# Patient Record
Sex: MALE | Race: WHITE | Employment: OTHER | URBAN - METROPOLITAN AREA
[De-identification: names, ages, dates, MRNs, and addresses within clinical notes are randomized per-mention and may not be internally consistent; named-entity substitution may affect disease eponyms.]

---

## 2022-07-01 ENCOUNTER — APPOINTMENT (OUTPATIENT)
Dept: CT IMAGING | Age: 72
DRG: 726 | End: 2022-07-01
Payer: MEDICARE

## 2022-07-01 ENCOUNTER — HOSPITAL ENCOUNTER (INPATIENT)
Age: 72
LOS: 1 days | Discharge: HOME | DRG: 726 | End: 2022-07-02
Attending: STUDENT IN AN ORGANIZED HEALTH CARE EDUCATION/TRAINING PROGRAM | Admitting: INTERNAL MEDICINE
Payer: MEDICARE

## 2022-07-01 DIAGNOSIS — N17.9 AKI (ACUTE KIDNEY INJURY) (HCC): ICD-10-CM

## 2022-07-01 DIAGNOSIS — N32.0 BLADDER OUTLET OBSTRUCTION: Primary | ICD-10-CM

## 2022-07-01 DIAGNOSIS — N40.0 ENLARGED PROSTATE: ICD-10-CM

## 2022-07-01 LAB
ALBUMIN SERPL-MCNC: 4.7 G/DL (ref 3.4–5)
ALP BLD-CCNC: 91 U/L (ref 40–129)
ALT SERPL-CCNC: 17 U/L (ref 10–40)
ANION GAP SERPL CALCULATED.3IONS-SCNC: 13 MMOL/L (ref 3–16)
AST SERPL-CCNC: 15 U/L (ref 15–37)
BASOPHILS ABSOLUTE: 0.1 K/UL (ref 0–0.2)
BASOPHILS RELATIVE PERCENT: 0.5 %
BILIRUB SERPL-MCNC: 1.3 MG/DL (ref 0–1)
BILIRUBIN DIRECT: <0.2 MG/DL (ref 0–0.3)
BILIRUBIN URINE: NEGATIVE
BILIRUBIN, INDIRECT: ABNORMAL MG/DL (ref 0–1)
BLOOD, URINE: ABNORMAL
BUN BLDV-MCNC: 37 MG/DL (ref 7–20)
CALCIUM SERPL-MCNC: 10.1 MG/DL (ref 8.3–10.6)
CHLORIDE BLD-SCNC: 99 MMOL/L (ref 99–110)
CLARITY: CLEAR
CO2: 24 MMOL/L (ref 21–32)
COLOR: YELLOW
CREAT SERPL-MCNC: 2.1 MG/DL (ref 0.8–1.3)
CREATININE URINE: 123.3 MG/DL (ref 39–259)
EOSINOPHILS ABSOLUTE: 0 K/UL (ref 0–0.6)
EOSINOPHILS RELATIVE PERCENT: 0.1 %
EPITHELIAL CELLS, UA: ABNORMAL /HPF (ref 0–5)
GFR AFRICAN AMERICAN: 37
GFR NON-AFRICAN AMERICAN: 31
GLUCOSE BLD-MCNC: 248 MG/DL (ref 70–99)
GLUCOSE URINE: 100 MG/DL
HCT VFR BLD CALC: 49.7 % (ref 40.5–52.5)
HEMOGLOBIN: 17 G/DL (ref 13.5–17.5)
KETONES, URINE: NEGATIVE MG/DL
LEUKOCYTE ESTERASE, URINE: NEGATIVE
LIPASE: 31 U/L (ref 13–60)
LYMPHOCYTES ABSOLUTE: 1.5 K/UL (ref 1–5.1)
LYMPHOCYTES RELATIVE PERCENT: 11.1 %
MCH RBC QN AUTO: 29.3 PG (ref 26–34)
MCHC RBC AUTO-ENTMCNC: 34.3 G/DL (ref 31–36)
MCV RBC AUTO: 85.4 FL (ref 80–100)
MICROSCOPIC EXAMINATION: YES
MONOCYTES ABSOLUTE: 1.2 K/UL (ref 0–1.3)
MONOCYTES RELATIVE PERCENT: 8.8 %
NEUTROPHILS ABSOLUTE: 10.6 K/UL (ref 1.7–7.7)
NEUTROPHILS RELATIVE PERCENT: 79.5 %
NITRITE, URINE: NEGATIVE
PDW BLD-RTO: 13.3 % (ref 12.4–15.4)
PH UA: 5.5 (ref 5–8)
PLATELET # BLD: 217 K/UL (ref 135–450)
PMV BLD AUTO: 7 FL (ref 5–10.5)
POTASSIUM REFLEX MAGNESIUM: 4.1 MMOL/L (ref 3.5–5.1)
PROTEIN UA: NEGATIVE MG/DL
RBC # BLD: 5.82 M/UL (ref 4.2–5.9)
RBC UA: ABNORMAL /HPF (ref 0–4)
SODIUM BLD-SCNC: 136 MMOL/L (ref 136–145)
SODIUM URINE: 43 MMOL/L
SPECIFIC GRAVITY UA: 1.02 (ref 1–1.03)
SPECIMEN STATUS: NORMAL
TOTAL PROTEIN: 7.5 G/DL (ref 6.4–8.2)
URINE REFLEX TO CULTURE: ABNORMAL
URINE TYPE: ABNORMAL
UROBILINOGEN, URINE: 0.2 E.U./DL
WBC # BLD: 13.4 K/UL (ref 4–11)
WBC UA: ABNORMAL /HPF (ref 0–5)

## 2022-07-01 PROCEDURE — 74176 CT ABD & PELVIS W/O CONTRAST: CPT

## 2022-07-01 PROCEDURE — 82570 ASSAY OF URINE CREATININE: CPT

## 2022-07-01 PROCEDURE — 2580000003 HC RX 258: Performed by: INTERNAL MEDICINE

## 2022-07-01 PROCEDURE — 51702 INSERT TEMP BLADDER CATH: CPT

## 2022-07-01 PROCEDURE — 1200000000 HC SEMI PRIVATE

## 2022-07-01 PROCEDURE — 6370000000 HC RX 637 (ALT 250 FOR IP): Performed by: INTERNAL MEDICINE

## 2022-07-01 PROCEDURE — 80076 HEPATIC FUNCTION PANEL: CPT

## 2022-07-01 PROCEDURE — 84300 ASSAY OF URINE SODIUM: CPT

## 2022-07-01 PROCEDURE — 81001 URINALYSIS AUTO W/SCOPE: CPT

## 2022-07-01 PROCEDURE — 99285 EMERGENCY DEPT VISIT HI MDM: CPT

## 2022-07-01 PROCEDURE — 2580000003 HC RX 258: Performed by: STUDENT IN AN ORGANIZED HEALTH CARE EDUCATION/TRAINING PROGRAM

## 2022-07-01 PROCEDURE — 85025 COMPLETE CBC W/AUTO DIFF WBC: CPT

## 2022-07-01 PROCEDURE — 80048 BASIC METABOLIC PNL TOTAL CA: CPT

## 2022-07-01 PROCEDURE — 83690 ASSAY OF LIPASE: CPT

## 2022-07-01 RX ORDER — SODIUM CHLORIDE 0.9 % (FLUSH) 0.9 %
5-40 SYRINGE (ML) INJECTION EVERY 12 HOURS SCHEDULED
Status: DISCONTINUED | OUTPATIENT
Start: 2022-07-01 | End: 2022-07-02 | Stop reason: HOSPADM

## 2022-07-01 RX ORDER — ACETAMINOPHEN 650 MG/1
650 SUPPOSITORY RECTAL EVERY 6 HOURS PRN
Status: DISCONTINUED | OUTPATIENT
Start: 2022-07-01 | End: 2022-07-02 | Stop reason: HOSPADM

## 2022-07-01 RX ORDER — ONDANSETRON 2 MG/ML
4 INJECTION INTRAMUSCULAR; INTRAVENOUS EVERY 6 HOURS PRN
Status: DISCONTINUED | OUTPATIENT
Start: 2022-07-01 | End: 2022-07-02 | Stop reason: HOSPADM

## 2022-07-01 RX ORDER — 0.9 % SODIUM CHLORIDE 0.9 %
1000 INTRAVENOUS SOLUTION INTRAVENOUS ONCE
Status: COMPLETED | OUTPATIENT
Start: 2022-07-01 | End: 2022-07-01

## 2022-07-01 RX ORDER — ACETAMINOPHEN 325 MG/1
650 TABLET ORAL EVERY 6 HOURS PRN
Status: DISCONTINUED | OUTPATIENT
Start: 2022-07-01 | End: 2022-07-02 | Stop reason: HOSPADM

## 2022-07-01 RX ORDER — ENOXAPARIN SODIUM 100 MG/ML
40 INJECTION SUBCUTANEOUS DAILY
Status: DISCONTINUED | OUTPATIENT
Start: 2022-07-01 | End: 2022-07-01

## 2022-07-01 RX ORDER — SODIUM CHLORIDE 9 MG/ML
INJECTION, SOLUTION INTRAVENOUS PRN
Status: DISCONTINUED | OUTPATIENT
Start: 2022-07-01 | End: 2022-07-02 | Stop reason: HOSPADM

## 2022-07-01 RX ORDER — SODIUM CHLORIDE 0.9 % (FLUSH) 0.9 %
5-40 SYRINGE (ML) INJECTION PRN
Status: DISCONTINUED | OUTPATIENT
Start: 2022-07-01 | End: 2022-07-02 | Stop reason: HOSPADM

## 2022-07-01 RX ORDER — SODIUM CHLORIDE 9 MG/ML
INJECTION, SOLUTION INTRAVENOUS CONTINUOUS
Status: DISCONTINUED | OUTPATIENT
Start: 2022-07-01 | End: 2022-07-02

## 2022-07-01 RX ORDER — DEXTROSE MONOHYDRATE 50 MG/ML
100 INJECTION, SOLUTION INTRAVENOUS PRN
Status: DISCONTINUED | OUTPATIENT
Start: 2022-07-01 | End: 2022-07-02 | Stop reason: HOSPADM

## 2022-07-01 RX ORDER — ONDANSETRON 4 MG/1
4 TABLET, ORALLY DISINTEGRATING ORAL EVERY 8 HOURS PRN
Status: DISCONTINUED | OUTPATIENT
Start: 2022-07-01 | End: 2022-07-02 | Stop reason: HOSPADM

## 2022-07-01 RX ORDER — TAMSULOSIN HYDROCHLORIDE 0.4 MG/1
0.4 CAPSULE ORAL DAILY
Status: DISCONTINUED | OUTPATIENT
Start: 2022-07-01 | End: 2022-07-02 | Stop reason: HOSPADM

## 2022-07-01 RX ADMIN — SODIUM CHLORIDE: 9 INJECTION, SOLUTION INTRAVENOUS at 14:14

## 2022-07-01 RX ADMIN — SODIUM CHLORIDE 1000 ML: 9 INJECTION, SOLUTION INTRAVENOUS at 05:45

## 2022-07-01 RX ADMIN — TAMSULOSIN HYDROCHLORIDE 0.4 MG: 0.4 CAPSULE ORAL at 08:23

## 2022-07-01 ASSESSMENT — ENCOUNTER SYMPTOMS
SHORTNESS OF BREATH: 0
COUGH: 0
ABDOMINAL PAIN: 1
SORE THROAT: 0
DIARRHEA: 0
EYE PAIN: 0
CONSTIPATION: 1
VOMITING: 0
BACK PAIN: 0
NAUSEA: 0

## 2022-07-01 ASSESSMENT — PAIN SCALES - GENERAL
PAINLEVEL_OUTOF10: 0
PAINLEVEL_OUTOF10: 9
PAINLEVEL_OUTOF10: 0

## 2022-07-01 ASSESSMENT — PAIN - FUNCTIONAL ASSESSMENT
PAIN_FUNCTIONAL_ASSESSMENT: NONE - DENIES PAIN
PAIN_FUNCTIONAL_ASSESSMENT: 0-10
PAIN_FUNCTIONAL_ASSESSMENT: 0-10

## 2022-07-01 NOTE — CONSULTS
Consult Call Back    Who:Dr. Jessica Winters  [facility]:5/6/1833,  Time:10:19 AM    Electronically signed by Markus Huynh on 7/1/22 at 10:19 AM EDT

## 2022-07-01 NOTE — H&P
Hospital Medicine History & Physical      PCP: Sethrandall Reddy    Date of Admission: 7/1/2022    Date of Service: Pt seen/examined on 07/01/22 and Admitted to Inpatient with expected LOS greater than two midnights due to medical therapy. Chief Complaint:  Lower belly pain    History Of Present Illness: The patient is a pleasant 74 Y M with h/o HTN and DM2. He is not obese, exercises, never smoked, and is in relatively good health. Starting about 6 months ago he noticed that he was having to get up to urinate multiple times overnight. Starting a few days ago he developed a painful distention in his pelvis and lower abdomen. He started to think he might be constipated. He called his PCP who started him on a laxative. This didn't seem to help. Eventually the symptoms got so bad that he had to come to the ED. CT scan in the ED showed an enlarged prostate with a severely distended bladder and bilateral hydroureteronephrosis. Labs showed that his Cr had gone from 1.3 to 2.1 in the last couple of months. A maria catheter was placed in the ED and drained over a liter of urine. The patient immediately felt much better. Other than the laxative he has not taken any new medications lately. He does not have any back pain or saddle paresthesias. Past Medical History:          Diagnosis Date    Hypertension        Past Surgical History:          Procedure Laterality Date    COLONOSCOPY  2011    polyp    EYE SURGERY   2005 x 2    right eye; cataract/repair of blood vessels       Medications Prior to Admission:      Prior to Admission medications    Medication Sig Start Date End Date Taking? Authorizing Provider   aspirin 81 MG chewable tablet Take 81 mg by mouth daily. Historical Provider, MD   LISINOPRIL Take by mouth daily Pt unsure of mg, will let us know    Historical Provider, MD       Allergies:  Patient has no known allergies.     Social History:      The patient currently lives at home    TOBACCO:   reports that he has never smoked. He does not have any smokeless tobacco history on file. ETOH:   reports current alcohol use. E-cigarette/Vaping     Questions Responses    E-cigarette/Vaping Use     Start Date     Passive Exposure     Quit Date     Counseling Given     Comments             Family History:      Reviewed and negative in regards to presenting illness/complaint. Problem Relation Age of Onset    Cancer Mother         breast    Cancer Father         lung    Heart Disease Father     High Blood Pressure Father     Heart Disease Paternal Uncle        REVIEW OF SYSTEMS COMPLETED:   Pertinent positives as noted in the HPI. All other systems reviewed and negative. PHYSICAL EXAM PERFORMED:    /78   Pulse 58   Temp 98.2 °F (36.8 °C) (Oral)   Resp 16   Ht 6' 1\" (1.854 m)   Wt 201 lb 3.2 oz (91.3 kg)   SpO2 97%   BMI 26.55 kg/m²     General appearance:  No apparent distress, appears stated age and cooperative. HEENT:  Normal cephalic, atraumatic without obvious deformity. Pupils equal, round, and reactive to light. Extra ocular muscles intact. Conjunctivae/corneas clear. Neck: Supple, with full range of motion. No jugular venous distention. Trachea midline. Respiratory:  Normal respiratory effort. Clear to auscultation, bilaterally without Rales/Wheezes/Rhonchi. Cardiovascular:  Regular rate and rhythm with normal S1/S2 without murmurs, rubs or gallops. Abdomen: Soft, non-tender, non-distended with normal bowel sounds. Musculoskeletal:  No clubbing, cyanosis or edema bilaterally. Full range of motion without deformity. Skin: Skin color, texture, turgor normal.  No rashes or lesions. Neurologic:  Neurovascularly intact without any focal sensory/motor deficits.  Cranial nerves: II-XII intact, grossly non-focal.  Psychiatric:  Alert and oriented, thought content appropriate, normal insight  Capillary Refill: Brisk,3 seconds, normal  Peripheral Pulses: +2 lisinopril+HCTZ until his renal function improves. DM2. A1c recently 6.9. He is on no meds. SSI while here. Stopped his prophylactic aspirin. DVT Prophylaxis: SCDs (some hematuria)  Diet: ADULT DIET; Regular; 4 carb choices (60 gm/meal)  Code Status: Full Code    PT/OT Eval Status: not indicated    Dispo - perhaps 7/1 - 7/3. Defer to urology whether maria needs to stay in place upon discharge and whether he needs to stay here until his Cr improves. Meg Ramesh MD    Thank you Drake Mena for the opportunity to be involved in this patient's care. If you have any questions or concerns please feel free to contact me at [phone].

## 2022-07-01 NOTE — ED PROVIDER NOTES
[address] ENCOUNTER      Pt Name: Jason Lopez  MRN: [id number]  [place] 1950  Date of evaluation: 7/1/2022  Provider: Vaishnavi Lee MD    CHIEF COMPLAINT       Chief Complaint   Patient presents with    Abdominal Pain    Urinary Retention     Abdominal pain, unable to urinate    HISTORY OF PRESENT ILLNESS   (Location/Symptom, Timing/Onset,Context/Setting, Quality, Duration, Modifying Factors, Severity)  Note limiting factors. Jason Lopez is a 74 y.o. male who presents to the ED with a chief complaint of abdominal pain and an inability to urinate for the past 2 days. Onset gradual, progressively worse, described as 9 out of 10, severe, crampy pain, feels as though that he cannot defecate as well. Last BM 2 days ago associate with nausea and 1 episode of emesis just prior to arrival.,  Emesis described as nonbloody nonbilious. Denies fevers, chest pain, shortness of breath. Denies significant past medical history. Symptoms not otherwise alleviated or exacerbated by other factors. NursingNotes were reviewed. REVIEW OF SYSTEMS    (2-9 systems for level 4, 10 or more for level 5)     Review of Systems   Constitutional: Negative for chills and fever. HENT: Negative for congestion and sore throat. Eyes: Negative for pain and visual disturbance. Respiratory: Negative for cough and shortness of breath. Cardiovascular: Negative for chest pain and palpitations. Gastrointestinal: Positive for abdominal pain and constipation. Negative for diarrhea, nausea and vomiting. Genitourinary: Positive for decreased urine volume and difficulty urinating. Negative for dysuria and frequency. Musculoskeletal: Negative for back pain and neck pain. Skin: Negative for rash and wound. Neurological: Negative for dizziness, weakness and light-headedness.           PAST MEDICAL HISTORY     Past Medical History:   Diagnosis Date    Hypertension SURGICALHISTORY       Past Surgical History:   Procedure Laterality Date    COLONOSCOPY  2011    polyp    EYE SURGERY   2005 x 2    right eye; cataract/repair of blood vessels         CURRENT MEDICATIONS       Current Discharge Medication List      CONTINUE these medications which have NOT CHANGED    Details   aspirin 81 MG chewable tablet Take 81 mg by mouth daily. LISINOPRIL Take by mouth daily Pt unsure of mg, will let us know             ALLERGIES     Patient has no known allergies. FAMILY HISTORY       Family History   Problem Relation Age of Onset    Cancer Mother         breast    Cancer Father         lung    Heart Disease Father     High Blood Pressure Father     Heart Disease Paternal Uncle           SOCIAL HISTORY       Social History     Socioeconomic History    Marital status:      Spouse name: None    Number of children: None    Years of education: None    Highest education level: None   Occupational History    None   Tobacco Use    Smoking status: Never Smoker    Smokeless tobacco: None   Substance and Sexual Activity    Alcohol use: Yes     Comment: very occasional- 1-2x/month    Drug use: None    Sexual activity: None   Other Topics Concern    None   Social History Narrative    None     Social Determinants of Health     Financial Resource Strain:     Difficulty of Paying Living Expenses: Not on file   Food Insecurity:     Worried About Running Out of Food in the Last Year: Not on file    Brooks of Food in the Last Year: Not on file   Transportation Needs:     Lack of Transportation (Medical): Not on file    Lack of Transportation (Non-Medical):  Not on file   Physical Activity:     Days of Exercise per Week: Not on file    Minutes of Exercise per Session: Not on file   Stress:     Feeling of Stress : Not on file   Social Connections:     Frequency of Communication with Friends and Family: Not on file    Frequency of Social Gatherings with Friends and Family: Not on file    Attends Taoist Services: Not on file    Active Member of Clubs or Organizations: Not on file    Attends Club or Organization Meetings: Not on file    Marital Status: Not on file   Intimate Partner Violence:     Fear of Current or Ex-Partner: Not on file    Emotionally Abused: Not on file    Physically Abused: Not on file    Sexually Abused: Not on file   Housing Stability:     Unable to Pay for Housing in the Last Year: Not on file    Number of [place] in the Last Year: Not on file    Unstable Housing in the Last Year: Not on file       SCREENINGS    Roby Coma Scale  Eye Opening: Spontaneous  Best Verbal Response: Oriented  Best Motor Response: Obeys commands  Roby Coma Scale Score: 15        PHYSICAL EXAM    (up to 7 for level 4, 8 or more for level 5)     ED Triage Vitals   BP Temp Temp Source Heart Rate Resp SpO2 Height Weight   07/01/22 0308 07/01/22 0308 07/01/22 0308 07/01/22 0308 07/01/22 0308 07/01/22 0308 07/01/22 0307 07/01/22 0307   (!) 157/86 97.7 °F (36.5 °C) Oral 63 16 95 % 6' 1\" (1.854 m) 200 lb (90.7 kg)       General: Alert and oriented appropriately for age, No acute distress. Eye: Normal conjunctiva. Sclera anicteric. HENT: Oral mucosa is moist.  Respiratory: Respirations even and non-labored. Clear to auscultation bilaterally. Cardiovascular: Normal rate, Regular rhythm. Intact peripheral pulses. No edema. Gastrointestinal: Soft, mildly distended abdomen, tender to palpation in the left lower quadrant  : deferred. Musculoskeletal: No swelling. Integumentary: Warm, Dry. Neurologic: Alert and appropriate for age. No focal deficits. Psychiatric: Cooperative.     DIAGNOSTIC RESULTS         RADIOLOGY:   Non-plain filmimages such as CT, Ultrasound and MRI are read by the radiologist. Plain radiographic images are visualized and preliminarily interpreted by the emergency physician with the below findings:      Interpretation per the Radiologist below, if available at the time ofthis note:    CT ABDOMEN PELVIS WO CONTRAST Additional Contrast? None   Final Result   Urinary bladder is markedly distended and there is moderate prostatomegaly. Bladder outlet obstruction suspected. Clinical correlation and Muñoz   catheter placement may be helpful. Bilateral hydronephrosis and hydroureter secondary to the bladder outlet   obstruction. 1.5 cm subtle low-attenuation lesion in the medial dome of the liver, likely   small cyst or hemangioma. Cholelithiasis. There is a 1 cm calcified gallstone. Mild sigmoid colon diverticulosis. ED BEDSIDE ULTRASOUND:   Performed by ED Physician - none    LABS:  Labs Reviewed   CBC WITH AUTO DIFFERENTIAL - Abnormal; Notable for the following components:       Result Value    WBC 13.4 (*)     Neutrophils Absolute 10.6 (*)     All other components within normal limits   BASIC METABOLIC PANEL W/ REFLEX TO MG FOR LOW K - Abnormal; Notable for the following components:    Glucose 248 (*)     BUN 37 (*)     CREATININE 2.1 (*)     GFR Non- 31 (*)     GFR  37 (*)     All other components within normal limits   HEPATIC FUNCTION PANEL - Abnormal; Notable for the following components: Total Bilirubin 1.3 (*)     All other components within normal limits   URINALYSIS WITH REFLEX TO CULTURE - Abnormal; Notable for the following components:    Glucose, Ur 100 (*)     Blood, Urine LARGE (*)     All other components within normal limits   MICROSCOPIC URINALYSIS - Abnormal; Notable for the following components:    RBC, UA  (*)     All other components within normal limits   LIPASE   SAMPLE POSSIBLE BLOOD BANK TESTING   CREATININE, RANDOM URINE   SODIUM, URINE, RANDOM   POCT GLUCOSE   POCT GLUCOSE   POCT GLUCOSE       All other labs were within normal range or not returned as of this dictation.       EMERGENCY DEPARTMENT COURSE and DIFFERENTIAL DIAGNOSIS/MDM: Vitals:    Vitals:    07/01/22 0511 07/01/22 0617 07/01/22 0629 07/01/22 0646   BP: (!) 145/82 138/83 (!) 150/90 130/78   Pulse: 68  64 58   Resp: 16  18 16   Temp: 98.1 °F (36.7 °C)  99.2 °F (37.3 °C) 98.2 °F (36.8 °C)   TempSrc: Oral  Oral Oral   SpO2: 96% 93% 95% 97%   Weight:    201 lb 3.2 oz (91.3 kg)   Height:    6' 1\" (1.854 m)         Medical decision making:  Consuelo Roy in 3 is a 75 yo M who p/w abd pain and distension, enlarged bladder on POCUS, nursing unable to place Muñoz, CT confirmed bladder outlet obstruction, has significant MAKSIM, Cr 2.1, prior normal values per outside chart review. After multiple attempts, 12 Fr Coude placed successfully, drained 1L thus far. Admitted for monitoring of renal function, continue treatment of MAKSIM. Medications   0.9 % sodium chloride bolus (1,000 mLs IntraVENous New Bag 7/1/22 0545)       Is this patient to be included in the SEP-1 Core Measure due to severe sepsis or septic shock? No   Exclusion criteria - the patient is NOT to be included for SEP-1 Core Measure due to: Infection is not suspected     Urethral Catheterization    Date/Time: 7/1/2022 7:01 AM  Performed by: Alonzo Segura MD  Authorized by: Yuridia Almaguer MD     Consent:     Consent obtained:  Verbal    Consent given by:  Patient    Risks discussed:  False passage, infection, pain, urethral injury and incomplete procedure    Alternatives discussed:  No treatment  Pre-procedure details:     Procedure purpose:  Therapeutic    Preparation: Patient was prepped and draped in usual sterile fashion    Anesthesia (see MAR for exact dosages): Anesthesia method:  None  Procedure details:     Provider performed due to:  Complicated insertion and nurse unable to complete    Catheter insertion:  Indwelling    Catheter type:  Coude    Catheter size:  12 Fr    Number of attempts:  3    Urine characteristics:  Bloody  Post-procedure details:     Patient tolerance of procedure:   Tolerated well, no immediate complications          FINAL IMPRESSION      1. Bladder outlet obstruction    2. Enlarged prostate    3. MAKSIM (acute kidney injury) ([facility] [address]          DISPOSITION/PLAN   DISPOSITION Admitted 07/01/2022 06:11:14 AM      PATIENT REFERRED TO:  No follow-up provider specified.     DISCHARGE MEDICATIONS:  Current Discharge Medication List             (Please note that portions of this note were completed with a voice recognition program.Efforts were made to edit the dictations but occasionally words are mis-transcribed.)    Krissy Deng MD (electronically signed)  Attending Emergency Physician          Krissy Deng MD  07/01/22 1246

## 2022-07-01 NOTE — CARE COORDINATION
CASE MANAGEMENT INITIAL ASSESSMENT    Reviewed chart and completed assessment with patient: Patient   Family present:None   Explained Case Management role/services. Primary contact information: [address] Decision Maker :   Primary Decision Maker: Noemi Peck - Spouse - [phone]    Secondary Decision Maker: Pato Jeff - Child - [phone]          Can this person be reached and be able to respond quickly, such as within a few minutes or hours? Yes      Admit date/status:07/01/2022 Inpatient   Diagnosis: Bladder outlet obstruction  Is this a Readmission?:  No      Insurance:[employer]    Precert required for SNF: Yes       3 night stay required: No    Living arrangements, Adls, care needs, prior to admission:HOme with spouse 3 RADAMES and step everywhere once inside [address] at home:  Denies     Services in the home and/or outpatient, prior to admission: Denies     Current PCP: Dr. Kellie Sutton             Medications: denies concerns     Transportation needs: Active ,spouse will  at dc      PT/OT recs: NA ambulating in room     Hospital Exemption Notification (HEN):NA    Barriers to discharge: Pending labs tomorrow am     Plan/comments: Patient from home with spouse IPTA, denies needs. Patient to go home with maria is aware. ALISHA Cagle       ECOC on chart for MD signature

## 2022-07-01 NOTE — CONSULTS
Urology Attending Consult Note      Reason for Consultation: 74     History: 75 y/o male was admitted with lower abdominal pain and was found to have urinary retention and ARF. He has had worsening urination lately. He does not have a Urologist and his PCP is with [facility].  Maria is now in place with ben urine. PSA 2.1 in May. Family History, Social History, Review of Systems:  Reviewed and agreed to as per chart    Vitals:  /78   Pulse 58   Temp 98.2 °F (36.8 °C) (Oral)   Resp 16   Ht 6' 1\" (1.854 m)   Wt 201 lb 3.2 oz (91.3 kg)   SpO2 97%   BMI 26.55 kg/m²   Temp  Av.2 °F (36.8 °C)  Min: 97.7 °F (36.5 °C)  Max: 99.2 °F (37.3 °C)    Intake/Output Summary (Last 24 hours) at 2022 0925  Last data filed at 2022 9120  Gross per 24 hour   Intake 180 ml   Output 850 ml   Net -670 ml         Physical:   Well developed, well nourished in no acute distress   Mood indicates no abnormalities. Pt doesnt appear depressed   Orientated to time and place   Neck is supple, trachea is midline   Respiratory effort is normal   Cardiovascular show no extremity swelling   Abdomen no masses or hernias are palpated, there is no tenderness. Liver and Spleen appear normal.   Skin show no abnormal lesions   Lymph nodes are not palpated in the inguinal, neck, or axillary area. Male :   Penis appears normal and circumcised   Urethral meatus has maria in place   Scrotum appears normal and both testicles appear normal in size and location   Sphincter has good tone   Anus is inspected.  There are no perineal masses   Prostate is enlarged, 80g, no tenderness and no induration   Seminal Vesicles are not palpable      Labs:  WBC:    Lab Results   Component Value Date/Time    WBC 13.4 2022 03:15 AM     Hemoglobin/Hematocrit:    Lab Results   Component Value Date/Time    HGB 17.0 2022 03:15 AM    HCT 49.7 2022 03:15 AM     BMP:    Lab Results   Component Value Date/Time    NA 136 07/01/2022 03:15 AM    K 4.1 07/01/2022 03:15 AM    CL 99 07/01/2022 03:15 AM    CO2 24 07/01/2022 03:15 AM    BUN 37 07/01/2022 03:15 AM    LABALBU 4.7 07/01/2022 03:15 AM    CREATININE 2.1 07/01/2022 03:15 AM    CALCIUM 10.1 07/01/2022 03:15 AM    GFRAA 37 07/01/2022 03:15 AM    LABGLOM 31 07/01/2022 03:15 AM     PT/INR:  No results found for: PROTIME, INR  PTT:  No results found for: APTT[APTT    Imaging: CT-   Urinary bladder is markedly distended and there is moderate prostatomegaly. Bladder outlet obstruction suspected.  Clinical correlation and Maria   catheter placement may be helpful.       Bilateral hydronephrosis and hydroureter secondary to the bladder outlet   obstruction. Impression/Plan: acute renal failure, urinary retention, BPH  1. Would monitor BMP at least 24 hours to ensure this improves with maria  2. Will arrange voiding trial next week in the office and would continue Flomax until and beyond that  3.  Will arrange TURP in a few weeks, discussed with patient    Ximena Caballero MD

## 2022-07-01 NOTE — CONSULTS
Consult placed    Who:Dr. Sandra Gandhi  [facility]:4/2/6694,  Time:7:58 AM        Electronically signed by Beatriz Alex on 7/1/2022 at 7:58 AM

## 2022-07-01 NOTE — ED NOTES
Gave report to C3 nurse, pt belongings placed in bag for transport     João Davenport, ANTONIO  07/01/22 Omega Garrido, ANTONIO  07/01/22 7943

## 2022-07-02 VITALS
RESPIRATION RATE: 16 BRPM | WEIGHT: 201.2 LBS | TEMPERATURE: 98.1 F | OXYGEN SATURATION: 98 % | HEART RATE: 66 BPM | DIASTOLIC BLOOD PRESSURE: 80 MMHG | SYSTOLIC BLOOD PRESSURE: 134 MMHG | HEIGHT: 73 IN | BODY MASS INDEX: 26.66 KG/M2

## 2022-07-02 LAB
ALBUMIN SERPL-MCNC: 3.5 G/DL (ref 3.4–5)
ANION GAP SERPL CALCULATED.3IONS-SCNC: 11 MMOL/L (ref 3–16)
BUN BLDV-MCNC: 34 MG/DL (ref 7–20)
CALCIUM SERPL-MCNC: 8.8 MG/DL (ref 8.3–10.6)
CHLORIDE BLD-SCNC: 105 MMOL/L (ref 99–110)
CO2: 25 MMOL/L (ref 21–32)
CREAT SERPL-MCNC: 1.4 MG/DL (ref 0.8–1.3)
GFR AFRICAN AMERICAN: 60
GFR NON-AFRICAN AMERICAN: 49
GLUCOSE BLD-MCNC: 119 MG/DL (ref 70–99)
GLUCOSE BLD-MCNC: 123 MG/DL (ref 70–99)
PERFORMED ON: ABNORMAL
PHOSPHORUS: 2.7 MG/DL (ref 2.5–4.9)
POTASSIUM SERPL-SCNC: 3.8 MMOL/L (ref 3.5–5.1)
SODIUM BLD-SCNC: 141 MMOL/L (ref 136–145)

## 2022-07-02 PROCEDURE — 80069 RENAL FUNCTION PANEL: CPT

## 2022-07-02 PROCEDURE — 36415 COLL VENOUS BLD VENIPUNCTURE: CPT

## 2022-07-02 PROCEDURE — 6370000000 HC RX 637 (ALT 250 FOR IP): Performed by: INTERNAL MEDICINE

## 2022-07-02 PROCEDURE — 2580000003 HC RX 258: Performed by: INTERNAL MEDICINE

## 2022-07-02 RX ORDER — TAMSULOSIN HYDROCHLORIDE 0.4 MG/1
0.4 CAPSULE ORAL DAILY
Qty: 30 CAPSULE | Refills: 3 | Status: ON HOLD | OUTPATIENT
Start: 2022-07-03 | End: 2022-07-15 | Stop reason: SDUPTHER

## 2022-07-02 RX ADMIN — SODIUM CHLORIDE: 9 INJECTION, SOLUTION INTRAVENOUS at 03:00

## 2022-07-02 RX ADMIN — TAMSULOSIN HYDROCHLORIDE 0.4 MG: 0.4 CAPSULE ORAL at 09:03

## 2022-07-02 NOTE — DISCHARGE SUMMARY
Hospital Medicine Discharge Summary    Patient ID: Demond Pablo      Patient's PCP: Rey Neville    Admit Date: 2022     Discharge Date:   22     Admitting Provider: Klarissa Engle MD     Discharge Provider: Ramon Apgar, MD     Discharge Diagnoses: Active Hospital Problems    Diagnosis     Bladder outlet obstruction [N32.0]      Priority: Medium       The patient was seen and examined on day of discharge and this discharge summary is in conjunction with any daily progress note from day of discharge. Hospital Course: The patient is a pleasant 74 Y M with h/o HTN and DM2. He is not obese, exercises, never smoked, and is in relatively good health. Starting about 6 months ago he noticed that he was having to get up to urinate multiple times overnight. Starting a few days ago he developed a painful distention in his pelvis and lower abdomen. He started to think he might be constipated. He called his PCP who started him on a laxative. This didn't seem to help. Eventually the symptoms got so bad that he had to come to the ED. CT scan in the ED showed an enlarged prostate with a severely distended bladder and bilateral hydroureteronephrosis. Labs showed that his Cr had gone from 1.3 to 2.1 in the last couple of months. A maria catheter was placed in the ED and drained over a liter of urine. The patient immediately felt much better. Other than the laxative he has not taken any new medications lately. He does not have any back pain or saddle paresthesias. Enlarged prostate with acute urinary retention and MAKSIM on CKD3. Cr improved quickly with maria placement, PCP could check again in a few weeks. Started tamsulosin. Urology recommended leaving the maria in place. They will perform a voiding trial in their clinic next week. He will need a TURP in the next few weeks.      HTN.   Held his lisinopril+HCTZ until his renal function improved, resumed upon discharge.      DM2. A1c recently 6.9. He is on no meds. SSI while here.      Stopped his prophylactic aspirin. Physical Exam Performed:     /80   Pulse 66   Temp 98.1 °F (36.7 °C) (Oral)   Resp 16   Ht 6' 1\" (1.854 m)   Wt 201 lb 3.2 oz (91.3 kg)   SpO2 98%   BMI 26.55 kg/m²       General appearance:  No apparent distress, appears stated age and cooperative. HEENT:  Normal cephalic, atraumatic without obvious deformity. Pupils equal, round, and reactive to light. Extra ocular muscles intact. Conjunctivae/corneas clear. Neck: Supple, with full range of motion. No jugular venous distention. Trachea midline. Respiratory:  Normal respiratory effort. Clear to auscultation, bilaterally without Rales/Wheezes/Rhonchi. Cardiovascular:  Regular rate and rhythm with normal S1/S2 without murmurs, rubs or gallops. Abdomen: Soft, non-tender, non-distended with normal bowel sounds. Musculoskeletal:  No clubbing, cyanosis or edema bilaterally. Full range of motion without deformity. Skin: Skin color, texture, turgor normal.  No rashes or lesions. Neurologic:  Neurovascularly intact without any focal sensory/motor deficits. Cranial nerves: II-XII intact, grossly non-focal.  Psychiatric:  Alert and oriented, thought content appropriate, normal insight  Capillary Refill: Brisk,3 seconds, normal  Peripheral Pulses: +2 palpable, equal bilaterally       Labs:  For convenience and continuity at follow-up the following most recent labs are provided:      CBC:    Lab Results   Component Value Date/Time    WBC 13.4 07/01/2022 03:15 AM    HGB 17.0 07/01/2022 03:15 AM    HCT 49.7 07/01/2022 03:15 AM     07/01/2022 03:15 AM       Renal:    Lab Results   Component Value Date/Time     07/02/2022 05:12 AM    K 3.8 07/02/2022 05:12 AM    K 4.1 07/01/2022 03:15 AM     07/02/2022 05:12 AM    CO2 25 07/02/2022 05:12 AM    BUN 34 07/02/2022 05:12 AM    CREATININE 1.4 07/02/2022 05:12 AM    CALCIUM 8.8 07/02/2022 05:12 AM    PHOS 2.7 07/02/2022 05:12 AM         Significant Diagnostic Studies    Radiology:   CT ABDOMEN PELVIS WO CONTRAST Additional Contrast? None   Final Result   Urinary bladder is markedly distended and there is moderate prostatomegaly. Bladder outlet obstruction suspected. Clinical correlation and Muñoz   catheter placement may be helpful. Bilateral hydronephrosis and hydroureter secondary to the bladder outlet   obstruction. 1.5 cm subtle low-attenuation lesion in the medial dome of the liver, likely   small cyst or hemangioma. Cholelithiasis. There is a 1 cm calcified gallstone. Mild sigmoid colon diverticulosis. Consults:     IP CONSULT TO UROLOGY    Disposition:  home     Condition at Discharge: Stable    Discharge Instructions/Follow-up:  Follow up with urology as planned. Code Status:  Full Code     Activity: activity as tolerated    Diet: encourage fluids      Discharge Medications:     Current Discharge Medication List           Details   tamsulosin (FLOMAX) 0.4 MG capsule Take 1 capsule by mouth daily  Qty: 30 capsule, Refills: 3              Details   LISINOPRIL Take 12.5 mg by mouth daily                Time Spent on discharge is more than 30 minutes in the examination, evaluation, counseling and review of medications and discharge plan. Signed:    Ijeoma Hassan MD   7/2/2022      Thank you Shala Horne for the opportunity to be involved in this patient's care. If you have any questions or concerns, please feel free to contact me at [phone].

## 2022-07-02 NOTE — PROGRESS NOTES
Shift assessment completed. Pt A&O x4. VSS. Pt eager to d/c this morning. Pt tolerating diet per order. Pt denies any pain. Muñoz cath in place draining ben clear output. Denies any needs at this time. Bed locked and in lowest position. Call light within reach. Will continue to monitor.  Electronically signed by Brien Mcgraw RN on 7/2/2022 at 9:42 AM

## 2022-07-02 NOTE — PROGRESS NOTES
Pt's verbal and written discharge instructions given, all questions answered. IV removed. Follow up appointments made and discussed. New medications reviewed. Pt left in stable condition via wheelchair to private car with family.  Electronically signed by Cayden Beaulieu RN on 7/2/2022 at 10:48 AM

## 2022-07-02 NOTE — PROGRESS NOTES
Jose Eliasve sent to  \"Pt IV infiltrated. It appears pt will go home today. Do you want a new IV placed or leave out in light of d/c? Thank you. \" Electronically signed by Ryan Carrasco RN on 7/2/2022 at 7:50 AM

## 2022-07-06 NOTE — PROGRESS NOTES
[facility]    Age 74 y.o.    male    1950    MRN [id number]    7/15/2022  Arrival Time_____________  OR Time____________97 Min     Procedure(s):  CYSTOSCOPY, TRANSURETHRAL RESECTION OF THE PROSTATE                      General   Surgeon(s):  Abeba Almonte, MD      DAY ADMIT ___  SDS/OP ___  OUTPT IN BED ___        Phone [phone] (home)     PCP _____________________ Phone_________________ Epic ( ) Epic CE ( ) Appt ________    ADDITIONAL INFO __________________________________ Cardio/Consult _____________    NOTES _____________________________________________________________________    ____________________________________________________________________________    PAT APPT DATE:________ TIME: ________  FAXED QAD: _______  (__) H&P w/ Hospitalist  __________________________________________________________________________  Preop Nurse phone screen complete: _____________  (__) CBC     (__) W/ DIFF ___________     (__) Hgb A1C    ___________  (__) CHEST X RAY   __________  (__) LIPID PROFILE  ___________  (__) EKG   __________  (__) PT/PTT   ___________  (__) PFT's   __________  (__) BMP   ___________  (__) CAROTIDS  __________  (__) CMP   ___________  (__) VEIN MAPPING  __________  (__) U/A   ___________  (__) HISTORY & PHYSICAL __________  (__) URINE C & S  ___________  (__) CARDIAC CLEARANCE __________  (__) U/A W/ FLEX  ___________  (__) PULM.  CLEARANCE __________  (__) SERUM PREGNANCY ___________  (__) Check Epic DOS orders __________  (__) TYPE & SCREEN __________repeat ( ) (__)  __________________ __________  (__) ALBUMIN Cindra Dorina ___________  (__)  __________________ __________  (__) TRANSFERRIN  ___________  (__)  __________________ __________  (__) LIVER PROFILE  ___________  (__)  __________________ __________  (__) MRSA NASAL SWAB ___________  (__) URINE PREG DOS __________  (__) SED RATE  ___________  (__) BLOOD SUGAR DOS __________  (__) C-REACTIVE PROTEIN ___________    (__) VITAMIN D HYDROXY ___________  (__) BLOOD THINNERS __________    (__) ACE/ ARBS: _____________________     (__) BETABLOCKERS __________________

## 2022-07-11 RX ORDER — ASPIRIN 81 MG/1
81 TABLET ORAL DAILY
COMMUNITY

## 2022-07-14 ENCOUNTER — ANESTHESIA EVENT (OUTPATIENT)
Dept: OPERATING ROOM | Age: 72
End: 2022-07-14
Payer: MEDICARE

## 2022-07-15 ENCOUNTER — ANESTHESIA (OUTPATIENT)
Dept: OPERATING ROOM | Age: 72
End: 2022-07-15
Payer: MEDICARE

## 2022-07-15 ENCOUNTER — HOSPITAL ENCOUNTER (OUTPATIENT)
Age: 72
Setting detail: OUTPATIENT SURGERY
Discharge: HOME | End: 2022-07-15
Attending: UROLOGY | Admitting: UROLOGY
Payer: MEDICARE

## 2022-07-15 VITALS
OXYGEN SATURATION: 97 % | BODY MASS INDEX: 26.39 KG/M2 | TEMPERATURE: 97 F | RESPIRATION RATE: 11 BRPM | WEIGHT: 200 LBS | SYSTOLIC BLOOD PRESSURE: 131 MMHG | HEART RATE: 53 BPM | DIASTOLIC BLOOD PRESSURE: 59 MMHG

## 2022-07-15 DIAGNOSIS — N40.1 BENIGN PROSTATIC HYPERPLASIA WITH LOWER URINARY TRACT SYMPTOMS, SYMPTOM DETAILS UNSPECIFIED: ICD-10-CM

## 2022-07-15 DIAGNOSIS — G89.18 POST-OP PAIN: Primary | ICD-10-CM

## 2022-07-15 PROCEDURE — 7100000001 HC PACU RECOVERY - ADDTL 15 MIN: Performed by: UROLOGY

## 2022-07-15 PROCEDURE — 2580000003 HC RX 258: Performed by: UROLOGY

## 2022-07-15 PROCEDURE — 3700000000 HC ANESTHESIA ATTENDED CARE: Performed by: UROLOGY

## 2022-07-15 PROCEDURE — A4217 STERILE WATER/SALINE, 500 ML: HCPCS | Performed by: UROLOGY

## 2022-07-15 PROCEDURE — 7100000000 HC PACU RECOVERY - FIRST 15 MIN: Performed by: UROLOGY

## 2022-07-15 PROCEDURE — 6360000002 HC RX W HCPCS: Performed by: UROLOGY

## 2022-07-15 PROCEDURE — 2720000010 HC SURG SUPPLY STERILE: Performed by: UROLOGY

## 2022-07-15 PROCEDURE — 2580000003 HC RX 258: Performed by: REGISTERED NURSE

## 2022-07-15 PROCEDURE — 3600000014 HC SURGERY LEVEL 4 ADDTL 15MIN: Performed by: UROLOGY

## 2022-07-15 PROCEDURE — 6360000002 HC RX W HCPCS: Performed by: REGISTERED NURSE

## 2022-07-15 PROCEDURE — 3600000004 HC SURGERY LEVEL 4 BASE: Performed by: UROLOGY

## 2022-07-15 PROCEDURE — 88305 TISSUE EXAM BY PATHOLOGIST: CPT

## 2022-07-15 PROCEDURE — 2500000003 HC RX 250 WO HCPCS: Performed by: REGISTERED NURSE

## 2022-07-15 PROCEDURE — 2709999900 HC NON-CHARGEABLE SUPPLY: Performed by: UROLOGY

## 2022-07-15 PROCEDURE — 2580000003 HC RX 258: Performed by: ANESTHESIOLOGY

## 2022-07-15 PROCEDURE — 3700000001 HC ADD 15 MINUTES (ANESTHESIA): Performed by: UROLOGY

## 2022-07-15 RX ORDER — PROPOFOL 10 MG/ML
INJECTION, EMULSION INTRAVENOUS PRN
Status: DISCONTINUED | OUTPATIENT
Start: 2022-07-15 | End: 2022-07-15 | Stop reason: SDUPTHER

## 2022-07-15 RX ORDER — SODIUM CHLORIDE 0.9 % (FLUSH) 0.9 %
5-40 SYRINGE (ML) INJECTION PRN
Status: DISCONTINUED | OUTPATIENT
Start: 2022-07-15 | End: 2022-07-15 | Stop reason: HOSPADM

## 2022-07-15 RX ORDER — ONDANSETRON 2 MG/ML
4 INJECTION INTRAMUSCULAR; INTRAVENOUS EVERY 30 MIN PRN
Status: DISCONTINUED | OUTPATIENT
Start: 2022-07-15 | End: 2022-07-15 | Stop reason: HOSPADM

## 2022-07-15 RX ORDER — MIDAZOLAM HYDROCHLORIDE 1 MG/ML
2 INJECTION INTRAMUSCULAR; INTRAVENOUS
Status: DISCONTINUED | OUTPATIENT
Start: 2022-07-15 | End: 2022-07-15 | Stop reason: HOSPADM

## 2022-07-15 RX ORDER — TAMSULOSIN HYDROCHLORIDE 0.4 MG/1
0.4 CAPSULE ORAL EVERY EVENING
Qty: 30 CAPSULE | Refills: 11 | Status: SHIPPED | OUTPATIENT
Start: 2022-07-15 | End: 2022-08-14

## 2022-07-15 RX ORDER — EPHEDRINE SULFATE 50 MG/ML
INJECTION, SOLUTION INTRAVENOUS PRN
Status: DISCONTINUED | OUTPATIENT
Start: 2022-07-15 | End: 2022-07-15 | Stop reason: SDUPTHER

## 2022-07-15 RX ORDER — MAGNESIUM HYDROXIDE 1200 MG/15ML
LIQUID ORAL CONTINUOUS PRN
Status: COMPLETED | OUTPATIENT
Start: 2022-07-15 | End: 2022-07-15

## 2022-07-15 RX ORDER — CEPHALEXIN 500 MG/1
500 CAPSULE ORAL 2 TIMES DAILY
Qty: 8 CAPSULE | Refills: 0 | Status: SHIPPED | OUTPATIENT
Start: 2022-07-15 | End: 2022-07-19

## 2022-07-15 RX ORDER — ATROPA BELLADONNA AND OPIUM 16.2; 6 MG/1; MG/1
60 SUPPOSITORY RECTAL EVERY 8 HOURS PRN
Status: DISCONTINUED | OUTPATIENT
Start: 2022-07-15 | End: 2022-07-15 | Stop reason: HOSPADM

## 2022-07-15 RX ORDER — SODIUM CHLORIDE, SODIUM LACTATE, POTASSIUM CHLORIDE, CALCIUM CHLORIDE 600; 310; 30; 20 MG/100ML; MG/100ML; MG/100ML; MG/100ML
INJECTION, SOLUTION INTRAVENOUS CONTINUOUS
Status: DISCONTINUED | OUTPATIENT
Start: 2022-07-15 | End: 2022-07-15 | Stop reason: HOSPADM

## 2022-07-15 RX ORDER — SODIUM CHLORIDE 0.9 % (FLUSH) 0.9 %
5-40 SYRINGE (ML) INJECTION EVERY 12 HOURS SCHEDULED
Status: DISCONTINUED | OUTPATIENT
Start: 2022-07-15 | End: 2022-07-15 | Stop reason: HOSPADM

## 2022-07-15 RX ORDER — MEPERIDINE HYDROCHLORIDE 50 MG/ML
12.5 INJECTION INTRAMUSCULAR; INTRAVENOUS; SUBCUTANEOUS EVERY 5 MIN PRN
Status: DISCONTINUED | OUTPATIENT
Start: 2022-07-15 | End: 2022-07-15 | Stop reason: HOSPADM

## 2022-07-15 RX ORDER — DIPHENHYDRAMINE HYDROCHLORIDE 50 MG/ML
6.25 INJECTION INTRAMUSCULAR; INTRAVENOUS
Status: DISCONTINUED | OUTPATIENT
Start: 2022-07-15 | End: 2022-07-15 | Stop reason: HOSPADM

## 2022-07-15 RX ORDER — OXYCODONE HYDROCHLORIDE 5 MG/1
5 TABLET ORAL
Status: DISCONTINUED | OUTPATIENT
Start: 2022-07-15 | End: 2022-07-15 | Stop reason: HOSPADM

## 2022-07-15 RX ORDER — FENTANYL CITRATE 50 UG/ML
INJECTION, SOLUTION INTRAMUSCULAR; INTRAVENOUS PRN
Status: DISCONTINUED | OUTPATIENT
Start: 2022-07-15 | End: 2022-07-15 | Stop reason: SDUPTHER

## 2022-07-15 RX ORDER — ONDANSETRON 2 MG/ML
INJECTION INTRAMUSCULAR; INTRAVENOUS PRN
Status: DISCONTINUED | OUTPATIENT
Start: 2022-07-15 | End: 2022-07-15 | Stop reason: SDUPTHER

## 2022-07-15 RX ORDER — HYDROCODONE BITARTRATE AND ACETAMINOPHEN 5; 325 MG/1; MG/1
1 TABLET ORAL EVERY 6 HOURS PRN
Qty: 12 TABLET | Refills: 0 | Status: SHIPPED | OUTPATIENT
Start: 2022-07-15 | End: 2022-07-20

## 2022-07-15 RX ORDER — SODIUM CHLORIDE, SODIUM LACTATE, POTASSIUM CHLORIDE, CALCIUM CHLORIDE 600; 310; 30; 20 MG/100ML; MG/100ML; MG/100ML; MG/100ML
INJECTION, SOLUTION INTRAVENOUS CONTINUOUS PRN
Status: DISCONTINUED | OUTPATIENT
Start: 2022-07-15 | End: 2022-07-15 | Stop reason: SDUPTHER

## 2022-07-15 RX ORDER — SODIUM CHLORIDE 9 MG/ML
25 INJECTION, SOLUTION INTRAVENOUS PRN
Status: DISCONTINUED | OUTPATIENT
Start: 2022-07-15 | End: 2022-07-15 | Stop reason: HOSPADM

## 2022-07-15 RX ADMIN — FENTANYL CITRATE 50 MCG: 50 INJECTION INTRAMUSCULAR; INTRAVENOUS at 08:12

## 2022-07-15 RX ADMIN — PROPOFOL 150 MG: 10 INJECTION, EMULSION INTRAVENOUS at 08:09

## 2022-07-15 RX ADMIN — LIDOCAINE HYDROCHLORIDE 50 MG: 10 INJECTION, SOLUTION INFILTRATION; PERINEURAL at 08:09

## 2022-07-15 RX ADMIN — CEFAZOLIN 2000 MG: 10 INJECTION, POWDER, FOR SOLUTION INTRAVENOUS at 08:12

## 2022-07-15 RX ADMIN — EPHEDRINE SULFATE 20 MG: 50 INJECTION INTRAMUSCULAR; INTRAVENOUS; SUBCUTANEOUS at 09:52

## 2022-07-15 RX ADMIN — EPHEDRINE SULFATE 30 MG: 50 INJECTION INTRAMUSCULAR; INTRAVENOUS; SUBCUTANEOUS at 09:49

## 2022-07-15 RX ADMIN — SODIUM CHLORIDE, POTASSIUM CHLORIDE, SODIUM LACTATE AND CALCIUM CHLORIDE: 600; 310; 30; 20 INJECTION, SOLUTION INTRAVENOUS at 06:50

## 2022-07-15 RX ADMIN — SODIUM CHLORIDE, SODIUM LACTATE, POTASSIUM CHLORIDE, AND CALCIUM CHLORIDE: .6; .31; .03; .02 INJECTION, SOLUTION INTRAVENOUS at 08:05

## 2022-07-15 RX ADMIN — FENTANYL CITRATE 50 MCG: 50 INJECTION INTRAMUSCULAR; INTRAVENOUS at 08:44

## 2022-07-15 RX ADMIN — ONDANSETRON 4 MG: 2 INJECTION INTRAMUSCULAR; INTRAVENOUS at 08:44

## 2022-07-15 ASSESSMENT — PAIN - FUNCTIONAL ASSESSMENT: PAIN_FUNCTIONAL_ASSESSMENT: 0-10

## 2022-07-15 ASSESSMENT — PAIN SCALES - GENERAL: PAINLEVEL_OUTOF10: 0

## 2022-07-15 NOTE — BRIEF OP NOTE
Brief Postoperative Note      Patient: oCnnor Wiseman  YOB: 1950  MRN: [id number]    Date of Procedure: 7/15/2022    Pre-Op Diagnosis: ENLARGED PROSTATE WITH LOWER URINARY TRACT SYMPTOMS, URINARY RETENTION    Post-Op Diagnosis: SAME       Procedure(s):  CYSTOSCOPY, TRANSURETHRAL RESECTION OF THE PROSTATE    Surgeon(s):  Liv Martin MD    Assistant:  Surgical Assistant: Chantel Puri Assistant: Hazel Maradiaga RN    Anesthesia: General    Estimated Blood Loss (mL): Minimal    Complications: None    Specimens:   ID Type Source Tests Collected by Time Destination   A : PROSTATE CHIPS Tissue Tissue SURGICAL PATHOLOGY Liv Martin MD 7/15/2022 1873        Implants:  * No implants in log *      Drains:   [REMOVED] Urinary Catheter Coude (Removed)   $ Urethral catheter insertion $ Not inserted for procedure 07/01/22 0716   Catheter Indications Urinary retention (acute or chronic), continuous bladder irrigation or bladder outlet obstruction 07/02/22 0906   Urine Color Mary Jane 07/02/22 0906   Urine Appearance Cloudy 07/02/22 0906   Urine Odor Malodorous 07/01/22 0529   Collection Container Standard 07/02/22 0906   Securement Method Securing device (Describe) 07/02/22 0906   Catheter Best Practices  Drainage tube clipped to bed;Catheter secured to thigh; Bag below bladder;Bag not on floor; Lack of dependent loop in tubing;Drainage bag less than half full 07/02/22 0906   Status Draining 07/02/22 0906   Output (mL) 550 mL 07/02/22 0553       Findings: LARGE PROSTATE    PLAN- D/C WITH YMRICK AND VOIDING TRIAL MONDAY    Electronically signed by Liv Martin MD on 7/15/2022 at 9:48 AM

## 2022-07-15 NOTE — ANESTHESIA POSTPROCEDURE EVALUATION
Department of Anesthesiology  Postprocedure Note    Patient: Jesus Shannon  MRN: [id number]  YOB: 1950  Date of evaluation: 7/15/2022      Procedure Summary     Date: 07/15/22 Room / Location: [address] [address] [address] / [facility]    Anesthesia Start: 0805 Anesthesia Stop: 5195    Procedure: CYSTOSCOPY, TRANSURETHRAL RESECTION OF THE PROSTATE (Bladder) Diagnosis:       Benign prostatic hyperplasia with lower urinary tract symptoms, symptom details unspecified      (ENLARGED PROSTATE WITH LOWER URINARY TRACT SYMPTOMS)    Surgeons: Keon Wing MD Responsible Provider: Gabriela Lynch MD    Anesthesia Type: General ASA Status: 2          Anesthesia Type: General    Arabella Phase I: Arabella Score: 10    Arabella Phase II:        Anesthesia Post Evaluation    Comments: Postoperative Anesthesia Note    Name:    Jesus Shannon  MRN:      [id number]    Patient Vitals in the past 12 hrs:  07/15/22 1141, BP:(!) 131/59, Pulse:53, Resp:11, SpO2:97 %  07/15/22 1130, Pulse:61, Resp:13, SpO2:99 %  07/15/22 1115, Pulse:56, Resp:10, SpO2:100 %  07/15/22 1100, Pulse:56, Resp:20, SpO2:99 %  07/15/22 1045, Pulse:57, Resp:14, SpO2:98 %  07/15/22 1035, Pulse:(!) 49, Resp:14, SpO2:100 %  07/15/22 1020, BP:(!) 124/59, Pulse:52, Resp:12, SpO2:98 %  07/15/22 1010, BP:126/61, Pulse:52, Resp:14, SpO2:100 %  07/15/22 1000, BP:122/60, Pulse:59, Resp:15, SpO2:99 %  07/15/22 0955, BP:124/67, Pulse:56, Resp:13, SpO2:100 %  07/15/22 0950, BP:115/63, Pulse:56, Resp:15, SpO2:99 %  07/15/22 0945, BP:(!) 54/36, Pulse:(!) 44, Resp:13, SpO2:99 %  07/15/22 0939, BP:(!) 73/52, Temp:97 °F (36.1 °C), Temp src:Temporal, Pulse:(!) 43, Resp:16, SpO2:99 %     LABS:    CBC  Lab Results       Component                Value               Date/Time                  WBC                      13.4 (H)            07/01/2022 03:15 AM        HGB                      17.0                07/01/2022 03:15 AM        HCT                      49.7 07/01/2022 03:15 AM        PLT                      217                 07/01/2022 03:15 AM   RENAL  Lab Results       Component                Value               Date/Time                  NA                       141                 07/02/2022 05:12 AM        K                        3.8                 07/02/2022 05:12 AM        K                        4.1                 07/01/2022 03:15 AM        CL                       105                 07/02/2022 05:12 AM        CO2                      25                  07/02/2022 05:12 AM        BUN                      34 (H)              07/02/2022 05:12 AM        CREATININE               1.4 (H)             07/02/2022 05:12 AM        GLUCOSE                  123 (H)             07/02/2022 05:12 AM   COAGS  No results found for: PROTIME, INR, APTT    Intake & Output: In: 8861 (P.O.:120; I.V.:600; Other:5000)  Out: 5150 (Urine:5100)    Nausea & Vomiting:  No    Level of Consciousness:  Awake    Pain Assessment:  Adequate analgesia    Anesthesia Complications:  No apparent anesthetic complications    SUMMARY      Vital signs stable  OK to discharge from Stage I post anesthesia care.   Care transferred from Anesthesiology department on discharge from perioperative area

## 2022-07-15 NOTE — PROGRESS NOTES
Dr. Mil Goldman aware of irrigation starting again, he checked on patient. Nurse weaned off irrigation and now it is off. Will continue to monitor. Nurse at bedside. Denies pain and nausea. Just having some normal pressure in area.

## 2022-07-15 NOTE — ANESTHESIA PRE PROCEDURE
Department of Anesthesiology  Preprocedure Note       Name:  Llye Client   Age:  74 y.o.  :  1950                                          MRN:  [id number]         Date:  7/15/2022      Surgeon: Sana Boyle):  Alexa Dubose MD    Procedure: Procedure(s):  CYSTOSCOPY, TRANSURETHRAL RESECTION OF THE PROSTATE    Medications prior to admission:   Prior to Admission medications    Medication Sig Start Date End Date Taking?  Authorizing Provider   aspirin 81 MG EC tablet Take 81 mg by mouth daily   Yes Historical Provider, MD   metFORMIN (GLUCOPHAGE) 500 MG tablet Take 500 mg by mouth 2 times daily (with meals)   Yes Historical Provider, MD   tamsulosin (FLOMAX) 0.4 MG capsule Take 1 capsule by mouth daily  Patient taking differently: Take 0.4 mg by mouth every evening 7/3/22   Derik Hampton MD   LISINOPRIL Take 12.5 mg by mouth daily     Historical Provider, MD       Current medications:    Current Facility-Administered Medications   Medication Dose Route Frequency Provider Last Rate Last Admin    ceFAZolin (ANCEF) 2000 mg in dextrose 5 % 100 mL IVPB  2,000 mg IntraVENous On Call to 500 Syl Avila MD        lactated ringers infusion   IntraVENous Continuous Sanya Ross MD 50 mL/hr at 07/15/22 0650 New Bag at 07/15/22 0650    lidocaine 1 % (PF) injection 0.1 mL  0.1 mL IntraDERmal Once PRN Sanya Ross MD           Allergies:  No Known Allergies    Problem List:    Patient Active Problem List   Diagnosis Code    Bladder outlet obstruction N32.0       Past Medical History:        Diagnosis Date    Elevated blood sugar     Enlarged prostate with lower urinary tract symptoms (LUTS)     Hypertension     Presence of indwelling Muñoz catheter 2022       Past Surgical History:        Procedure Laterality Date    COLONOSCOPY      polyp    EYE SURGERY   2005 x 2    right eye; cataract/repair of blood vessels    SHOULDER SURGERY Right     dislocated shoulder       Social hours.    Coags: No results found for: PROTIME, INR, APTT    HCG (If Applicable): No results found for: PREGTESTUR, PREGSERUM, HCG, HCGQUANT     ABGs: No results found for: PHART, PO2ART, ZLF1HCS, DOL4UNE, BEART, H5IBGNHN     Type & Screen (If Applicable):  No results found for: LABABO, LABRH    Drug/Infectious Status (If Applicable):  No results found for: HIV, HEPCAB    COVID-19 Screening (If Applicable): No results found for: COVID19        Anesthesia Evaluation  Patient summary reviewed and Nursing notes reviewed no history of anesthetic complications:   Airway: Mallampati: II     Neck ROM: full     Dental:          Pulmonary:                              Cardiovascular:    (+) hypertension:,                   Neuro/Psych:               GI/Hepatic/Renal:             Endo/Other:                     Abdominal:             Vascular: Other Findings:           Anesthesia Plan      general     ASA 2     (Medications & allergies reviewed  All available lab & EKG data reviewed)  Induction: intravenous. Anesthetic plan and risks discussed with patient. Plan discussed with CRNA.                     Ole Fuentes MD   7/15/2022

## 2022-07-15 NOTE — DISCHARGE INSTRUCTIONS
[facility]    Routine maria care. Cystoscopy Discharge Instructions    You may experience : Burning sensation when you void     A feeling of a need to go to the bathroom frequently     Your urine may be blood tinged    These symptoms should be relieved within a few hours as you increase the amounts of fluids you drink and the number of times that you empty your bladder. We recommended that you drink plenty of fluid a few days after surgery. No strenuous activities until you talk to your doctor. You may feel light headed up to 24 hours after anesthesia. You should not do the following for the next 24 hours. Drive a car, operate machinery or power tools     Drink any alcoholic drinks (not even beer or wine)     Make any important decisions, i.e., signing important papers. It is recommended that you begin with clear liquids and/or light foods. If you  Are not nauseated, progress to your normal diet. I you are unable to urinate you should call your surgeon. Dr. Jessica Vnetura are under the influence of drugs- do not drink alcohol, drive a car, operate machinery(such as power tools, kitchen appliances, etc), sign legal documents, or make any important decisions for 24 hours (or while on pain medications). Children should not ride bikes or Potter or play on gym sets  for 24 hours after surgery. A responsible adult should be with you for 24 hours. Rest at home today- increase activity as tolerated. Progress slowly to a regular diet unless your physician has instructed you otherwise. Drink plenty of water. CALL YOUR DOCTOR IF YOU:  Have moderate to severe nausea or vomiting AND are unable to hold down fluids or prescribed medications. Have bright red bloody drainage from your dressing that won't stop oozing.   Do not get relief with your pain medication    NORMAL (POSSIBLE) SIDE EFFECTS FROM ANESTHESIA:     Confusion, temporary memory loss, delayed reaction times in the first 24 hours  Lightheadedness, dizziness, difficulty focusing, blurred vision  Nausea/vomiting can happen  Shivering, feeling cold, sore throat, cough and muscle aches should stop within 24-48 hours  Trouble urinating - call your surgeon if it has been more than 8 hrs  Bruising or soreness at the IV site - call if it remains red, firm or there is drainage             FEMALES OF CHILDBEARING AGE WHO ARE TAKING BIRTH CONTROL PILLS:  You may have received a medication during your procedure that interferes with the   actions of birth control pills (Bridion or Emend). Use some other kind of birth control in addition to your pills, like a condom, for 1 month after your procedure to prevent unwanted pregnancy. The following instructions are to be followed if you have a known history or diagnosis of sleep apnea: For all sleep apnea patients:  ? Sleep on your side or sitting up in a chair whenever possible, especially the first 24 hours after surgery. ? Use only medicines prescribed by your doctor. ? Do not drink alcohol. ? If you have a dental device to assist you while at rest, use it at all times for the first 24 hours. For patients using CPAP machines:  ? Use your CPAP machine during all periods of sleep as usual.  ? Use your CPAP machine during all periods of daytime rest while on pain medicines. ** Follow up with your primary care doctor for continued care. IF YOU DO NOT TAKE ALL OF YOUR NARCOTIC PAIN MEDICATION, please dispose of them responsibly. There are drop off boxes in the Emergency Departments 24/7 at both [facility] and [facility]. If these locations are not convenient, other options for discarding them can be found at:  [email or web address]    Hospital or office staff may NOT accept any medications to drop off in the cabinet for you.

## 2022-07-16 NOTE — OP NOTE
[address] [address]                                OPERATIVE REPORT    PATIENT NAME: Susie Siegel                  :        1950  MED REC NO:   [id number]                          ROOM:  ACCOUNT NO:   [de-identified]                           ADMIT DATE: 07/15/2022  PROVIDER:     Waldemar Hidalgo. Kitty Cuellar MD    DATE OF PROCEDURE:  07/15/2022    PREOPERATIVE DIAGNOSES:  Urinary retention, BPH. POSTOPERATIVE DIAGNOSES:  Urinary retention, BPH. OPERATION PERFORMED:  Cystoscopy with transurethral resection of the  prostate. SURGEON:  Waldemar Hidalgo. Kitty Cuellar MD    INDICATION FOR THE PROCEDURE:  The patient is a 68-year-old male who has  had acute kidney injury due to urinary retention. He has a Muñoz in and  BPH. The risks and benefits of a cystoscopy and transurethral resection  of the prostate were discussed with the patient. He agreed to proceed. DESCRIPTION OF THE PROCEDURE:  The patient had preoperative antibiotics,  general anesthesia, was in lithotomy position. Genitalia prepped and  draped in the usual sterile fashion. A 21-Emirati rigid cystoscope was  inserted into the patient's urethra. The patient does have significant  trilobar hypertrophy. I removed the scope and put a 24-Emirati bipolar  loop resectoscope. I then resected the median lobe. I then resected  the right lobe at the 6 o'clock position going clockwise up to 12  o'clock position. I did not go distal to the verumontanum. I then  resected the left lobe starting at 6 o'clock position going  counterclockwise. I made sure there was good hemostasis. Using [facility]  evacuator, I get all the chips out. After I had resected over an hour  and 15 minutes. I put a 22-Emirati three-way catheter in. He tolerated  the procedure well. He was sent to the recovery room with continuous  bladder irrigation. ESTIMATED BLOOD LOSS:  100 mL.     PLAN:  We are going to get a catheter out in three days in the office.         Sonya Bernal MD    D: 07/15/2022 12:26:05       T: 07/15/2022 15:50:22     [email or web address]  Job#: [id number]     Doc#: [id number]    CC:

## (undated) DEVICE — BAG DRNGE COMB PK

## (undated) DEVICE — SOLUTION IRRIG 2000ML 0.9% SOD CHL USP UROMATIC PLAS CONT

## (undated) DEVICE — CATHETER URETH BLLN 30CC 22FR SIL ELASTMR F 3 W SPEC M RND

## (undated) DEVICE — ADAPTER, OES PRO OUTER SHEATH TO ELLIK AND SYRINGE: Brand: OLYMPUS

## (undated) DEVICE — SYRINGE,TOOMEY,IRRIGATION,70CC,STERILE: Brand: MEDLINE

## (undated) DEVICE — STERILE POLYISOPRENE POWDER-FREE SURGICAL GLOVES WITH EMOLLIENT COATING: Brand: PROTEXIS

## (undated) DEVICE — EVACUATOR URO BLDR W/ ADPT UROVAC

## (undated) DEVICE — CYSTO: Brand: MEDLINE INDUSTRIES, INC.

## (undated) DEVICE — Z CONVERTED USE 2271043 CONTAINER SPEC COLL 4OZ SCR ON LID PEEL PCH

## (undated) DEVICE — ELECTRODE ES WIDE FRONTLOADING SURF LOOP SUPERSECT

## (undated) DEVICE — SET IRRIG L94IN DIA0.281IN L BOR W/ 2 N VENT SPIK 2 ON/OFF